# Patient Record
Sex: MALE
[De-identification: names, ages, dates, MRNs, and addresses within clinical notes are randomized per-mention and may not be internally consistent; named-entity substitution may affect disease eponyms.]

---

## 2023-02-27 PROBLEM — Z00.00 ENCOUNTER FOR PREVENTIVE HEALTH EXAMINATION: Status: ACTIVE | Noted: 2023-02-27

## 2023-03-07 ENCOUNTER — APPOINTMENT (OUTPATIENT)
Dept: UROLOGY | Facility: CLINIC | Age: 73
End: 2023-03-07
Payer: MEDICARE

## 2023-03-07 VITALS — BODY MASS INDEX: 23.99 KG/M2 | HEIGHT: 69 IN | WEIGHT: 162 LBS

## 2023-03-07 VITALS
TEMPERATURE: 98 F | HEART RATE: 70 BPM | SYSTOLIC BLOOD PRESSURE: 125 MMHG | DIASTOLIC BLOOD PRESSURE: 73 MMHG | OXYGEN SATURATION: 95 %

## 2023-03-07 DIAGNOSIS — C61 MALIGNANT NEOPLASM OF PROSTATE: ICD-10-CM

## 2023-03-07 PROCEDURE — 99204 OFFICE O/P NEW MOD 45 MIN: CPT

## 2023-03-08 LAB
APPEARANCE: CLEAR
BACTERIA: NEGATIVE
BILIRUBIN URINE: NEGATIVE
BLOOD URINE: NEGATIVE
CALCIUM OXALATE CRYSTALS: ABNORMAL
COLOR: ABNORMAL
GLUCOSE QUALITATIVE U: NEGATIVE
HYALINE CASTS: 0 /LPF
KETONES URINE: ABNORMAL
LEUKOCYTE ESTERASE URINE: NEGATIVE
MICROSCOPIC-UA: NORMAL
NITRITE URINE: NEGATIVE
PH URINE: 5.5
PROTEIN URINE: ABNORMAL
RED BLOOD CELLS URINE: 0 /HPF
SPECIFIC GRAVITY URINE: 1.03
SQUAMOUS EPITHELIAL CELLS: 0 /HPF
UROBILINOGEN URINE: NORMAL
WHITE BLOOD CELLS URINE: 1 /HPF

## 2023-03-08 NOTE — HISTORY OF PRESENT ILLNESS
[FreeTextEntry1] : Dr. Vasquez Gallegos\par \par Dr. Lauren Ro\par [address]\[place] [address]\par \par \par CC: Prostate Cancer\par \par 72 year old male with pMHX significant for Diverticulosis (last year), elevated cholesterol, right should bursitis, GERD, migraine headaches, trigeminal neuralgia, umbilical hernia, ventral hernia.\par \par PSA increased to 3.9 with ? abnormal LEANNA by Dr. Gallegos\par MRI showed 37 cc gland with PIRADs 4 lesion 1 cm right posterior apex/mid gland\par \par Biopsy:\par Target lesion- GG2\par Right lateral apex- GG2\par Right lateral mid- GG2\par \par Has noticed a decrease in libido since biopsy\par Erections prior to biopsy were good, no problems \par \par Finasteride and Alfuzosin for many years\par Reports nocturia x 3, good stream, no intermittency of stream, no straining to start stream \par \par Followed with Dr. Solitario [facility]  for the past 4 years \par 2.9-3.1 on Finasteride \par \par He is active, and bike rides up to 60 miles per day \par \par Social Hx: nonsmoker, occasional ETOH use, retired carpet \par Surgical Hx: prostate biopsy \par Family Hx: no prostate, mother with breast cancer\par ROS:  Negative 10 system review

## 2023-03-08 NOTE — ASSESSMENT
[FreeTextEntry1] : Today we discussed the natural history of localized prostate cancer, and the heterogeneous biology of this malignancy. We discussed the fact that many prostate cancers are slow growing and unlikely to metastasize or cause death, whereas others can be life threatening. We reviewed risk stratification, staging, Red scoring, and PSA levels as they pertain to risk. \par \par All treatment options were reviewed. This included active surveillance, androgen deprivation, emerging options such as focal therapy/HIFU/cryotherapy, radiation options (including IMRT, SBRT, brachytherapy) and surgical options (open vs. robotic surgery, nerve vs. non-nerve sparing). Oncologic outcomes were compared and contrasted. Risks of biochemical and clinical recurrence discussed. Risks of needing adjuvant or salvage treatments reviewed. We discussed the risks of secondary malignancy after radiation. We discussed the opportunity for pathological staging with surgery vs. other options. We discussed the possibility of positive margins, treatment failure, cancer recurrence and cancer-related death even with treatment. \par \par All potential side effects of treatment were reviewed including, but not limited to: short term or permanent stress urinary incontinence and/or short term or permanent erectile dysfunction, penile shortening, rectal symptoms/pain, perineal pain, and other side effects. \par \par All potential complications of treatment and surgery were reviewed including, but not limited to: risks of conversion from MIS to open surgery discussed, bleeding//life-threatening hemorrhage, rectal injury requiring colostomy or delayed recognition leading to fistula, vascular/bowel/adjacent visceral organ injury, trocar/access injury, the possibility of recognized vs. unrecognized/delayed-recognition injury, risks of thermal/blunt/sharp/retraction injury, risks of DVT, PE, MI, death, risks of cardiopulmonary/anesthesia related complications, positional injury, infection/collection/abscess, wound complications/dehiscence/seroma/cellulitis, urinoma/fistula, ureteral injury/obstruction, bladder neck contracture, penile shortening, meatal stenosis, urethral stricture, lymphocele, obturator nerve injury, prolonged anastomotic leak, and other complications. \par \par \par \par

## 2023-03-17 LAB — BACTERIA UR CULT: NORMAL

## 2023-03-20 ENCOUNTER — OUTPATIENT (OUTPATIENT)
Dept: OUTPATIENT SERVICES | Facility: HOSPITAL | Age: 73
LOS: 1 days | End: 2023-03-20
Payer: MEDICARE

## 2023-03-20 DIAGNOSIS — C61 MALIGNANT NEOPLASM OF PROSTATE: ICD-10-CM

## 2023-03-21 ENCOUNTER — RESULT REVIEW (OUTPATIENT)
Age: 73
End: 2023-03-21

## 2023-03-21 LAB — SURGICAL PATHOLOGY STUDY: SIGNIFICANT CHANGE UP

## 2023-03-21 PROCEDURE — 88321 CONSLTJ&REPRT SLD PREP ELSWR: CPT

## 2023-04-05 ENCOUNTER — APPOINTMENT (OUTPATIENT)
Dept: UROLOGY | Facility: CLINIC | Age: 73
End: 2023-04-05
Payer: MEDICARE

## 2023-04-05 DIAGNOSIS — N13.8 BENIGN PROSTATIC HYPERPLASIA WITH LOWER URINARY TRACT SYMPMS: ICD-10-CM

## 2023-04-05 DIAGNOSIS — N40.1 BENIGN PROSTATIC HYPERPLASIA WITH LOWER URINARY TRACT SYMPMS: ICD-10-CM

## 2023-04-05 PROCEDURE — 99443: CPT | Mod: 95

## 2023-04-05 RX ORDER — ALFUZOSIN HYDROCHLORIDE 10 MG/1
TABLET, EXTENDED RELEASE ORAL
Refills: 0 | Status: ACTIVE | COMMUNITY

## 2023-04-05 RX ORDER — RIZATRIPTAN BENZOATE 5 MG/1
5 TABLET ORAL
Refills: 0 | Status: ACTIVE | COMMUNITY

## 2023-04-05 RX ORDER — TADALAFIL 5 MG/1
5 TABLET ORAL
Qty: 90 | Refills: 3 | Status: ACTIVE | COMMUNITY
Start: 2023-04-05 | End: 1900-01-01

## 2023-04-05 RX ORDER — SUMATRIPTAN 25 MG/1
25 TABLET, FILM COATED ORAL
Refills: 0 | Status: ACTIVE | COMMUNITY

## 2023-04-05 RX ORDER — ATORVASTATIN CALCIUM 10 MG/1
10 TABLET, FILM COATED ORAL
Refills: 0 | Status: ACTIVE | COMMUNITY

## 2023-04-05 RX ORDER — FINASTERIDE 5 MG/1
5 TABLET, FILM COATED ORAL
Refills: 0 | Status: ACTIVE | COMMUNITY

## 2023-04-05 NOTE — HISTORY OF PRESENT ILLNESS
[Home] : at home, [unfilled] , at the time of the visit. [Medical Office: ([facility])___] : at the medical office located in  [Verbal consent obtained from patient] : the patient, [unfilled] [FreeTextEntry1] : Dr. Johnathan Mosher (cardiologist)\par [address],\[place] [address]\par \par Dr. Lauren Ro\par [address] [facility] [facility] [address]\[place] [address]\par \par Dr. Vasquez Gallegos\par \par CC: Prostate Cancer\par \par Mr. Gomez is a 73 year old male who will be undergoing a SP radical prostatectomy with Dr. Rolon on 4/12/23.  He has a pMHx significant for Diverticulosis (last year), elevated cholesterol, GERD, migraine headaches, trigeminal neuralgia, umbilical hernia, ventral hernia.\par \par PSA increased to 3.9 ( Corrected to ~8 on Finasteride) with ? abnormal LEANNA by Dr. Gallegos\par MRI showed 37 cc gland with PIRADs 4 lesion 1 cm right posterior apex/mid gland\par \par Followed with Dr. Solitario [facility] for the past 4 years \par 2.9-3.1 on Finasteride \par \par \par Biopsy:\par Target lesion- GG2\par Right lateral apex- GG2\par Right lateral mid- GG2\par \par Libido has improved after biopsy \par Erections are good, no trouble maintaining erection\par \par Has been taking Finasteride and Alfuzosin for many years\par Reports nocturia x 3, good stream, no intermittency of stream, no straining to start stream \par \par \par \par Social Hx: nonsmoker, occasional ETOH use, retired carpet \par Surgical Hx: prostate biopsy \par Family Hx: no prostate, mother with breast cancer\par

## 2023-04-05 NOTE — ASSESSMENT
[FreeTextEntry1] : Diagnosis: Prostate Cancer\par \par Plan:\par \par He can stop Alfuzosin and Finasteride after procedure \par \par We discussed expected side effects of robotic surgery including but not limited to scrotal edema and ecchymosis that may last for greater than 1 week, referred should pain post procedure, bladder spams while catheter is in place, intermittent hematuria, and constipation management.\par \par We discussed catheter.  He is aware that catheter will be in place for minimum of 7 days post operatively.  He will start isela cath antibiotics 1 day prior to removal.  I demonstrated how to properly switch from a leg bag to overnight bag.  We also reviewed importance of applying stat lock to prevent catheter from puling.\par \par It is important that he is up and ambulating day for procedure which he verbalized an understanding of.  We discussed incision care and time line for showering.  No heavy lifting for the next 4 weeks post procedure.\par \par I recommend he start Daily Tadalafil 5 mg and continue post procedure.  \par \par He will begin Kegel exercises today with a goal of 50 Kegels per day.\par \par Follow up 1 week after procedure\par

## 2023-04-11 ENCOUNTER — TRANSCRIPTION ENCOUNTER (OUTPATIENT)
Age: 73
End: 2023-04-11

## 2023-04-11 VITALS
SYSTOLIC BLOOD PRESSURE: 126 MMHG | WEIGHT: 169.76 LBS | HEIGHT: 69 IN | RESPIRATION RATE: 16 BRPM | OXYGEN SATURATION: 99 % | TEMPERATURE: 97 F | DIASTOLIC BLOOD PRESSURE: 79 MMHG | HEART RATE: 61 BPM

## 2023-04-11 NOTE — PATIENT PROFILE ADULT - FALL HARM RISK - UNIVERSAL INTERVENTIONS
Bed in lowest position, wheels locked, appropriate side rails in place/Call bell, personal items and telephone in reach/Instruct patient to call for assistance before getting out of bed or chair/Non-slip footwear when patient is out of bed/[place] to call system/Physically safe environment - no spills, clutter or unnecessary equipment/Purposeful Proactive Rounding/Room/bathroom lighting operational, light cord in reach

## 2023-04-12 ENCOUNTER — RESULT REVIEW (OUTPATIENT)
Age: 73
End: 2023-04-12

## 2023-04-12 ENCOUNTER — APPOINTMENT (OUTPATIENT)
Dept: UROLOGY | Facility: HOSPITAL | Age: 73
End: 2023-04-12

## 2023-04-12 ENCOUNTER — TRANSCRIPTION ENCOUNTER (OUTPATIENT)
Age: 73
End: 2023-04-12

## 2023-04-12 ENCOUNTER — INPATIENT (INPATIENT)
Facility: HOSPITAL | Age: 73
LOS: 0 days | Discharge: HOME | DRG: 708 | End: 2023-04-13
Attending: UROLOGY | Admitting: UROLOGY
Payer: MEDICARE

## 2023-04-12 DIAGNOSIS — Z90.89 ACQUIRED ABSENCE OF OTHER ORGANS: Chronic | ICD-10-CM

## 2023-04-12 DIAGNOSIS — C61 MALIGNANT NEOPLASM OF PROSTATE: ICD-10-CM

## 2023-04-12 DIAGNOSIS — Z98.890 OTHER SPECIFIED POSTPROCEDURAL STATES: Chronic | ICD-10-CM

## 2023-04-12 LAB
BLD GP AB SCN SERPL QL: NEGATIVE — SIGNIFICANT CHANGE UP
RH IG SCN BLD-IMP: POSITIVE — SIGNIFICANT CHANGE UP

## 2023-04-12 PROCEDURE — 38571 LAPAROSCOPY LYMPHADENECTOMY: CPT

## 2023-04-12 PROCEDURE — 55866 LAPS SURG PRST8ECT RPBIC RAD: CPT

## 2023-04-12 PROCEDURE — 88309 TISSUE EXAM BY PATHOLOGIST: CPT | Mod: 26

## 2023-04-12 PROCEDURE — 88305 TISSUE EXAM BY PATHOLOGIST: CPT | Mod: 26

## 2023-04-12 DEVICE — SURGICEL FIBRILLAR 4 X 4": Type: IMPLANTABLE DEVICE | Status: FUNCTIONAL

## 2023-04-12 DEVICE — LIGATING CLIPS WECK HEMOLOK POLYMER MEDIUM-LARGE (GREEN) 6: Type: IMPLANTABLE DEVICE | Status: FUNCTIONAL

## 2023-04-12 RX ORDER — ACETAMINOPHEN 500 MG
650 TABLET ORAL EVERY 6 HOURS
Refills: 0 | Status: DISCONTINUED | OUTPATIENT
Start: 2023-04-12 | End: 2023-04-13

## 2023-04-12 RX ORDER — BENZOCAINE AND MENTHOL 5; 1 G/100ML; G/100ML
1 LIQUID ORAL
Refills: 0 | Status: DISCONTINUED | OUTPATIENT
Start: 2023-04-12 | End: 2023-04-13

## 2023-04-12 RX ORDER — SODIUM CHLORIDE 9 MG/ML
1000 INJECTION INTRAMUSCULAR; INTRAVENOUS; SUBCUTANEOUS
Refills: 0 | Status: DISCONTINUED | OUTPATIENT
Start: 2023-04-12 | End: 2023-04-13

## 2023-04-12 RX ORDER — ATORVASTATIN CALCIUM 80 MG/1
10 TABLET, FILM COATED ORAL AT BEDTIME
Refills: 0 | Status: DISCONTINUED | OUTPATIENT
Start: 2023-04-12 | End: 2023-04-13

## 2023-04-12 RX ORDER — LIDOCAINE 4 G/100G
2 CREAM TOPICAL DAILY
Refills: 0 | Status: DISCONTINUED | OUTPATIENT
Start: 2023-04-12 | End: 2023-04-13

## 2023-04-12 RX ORDER — OXYBUTYNIN CHLORIDE 5 MG
5 TABLET ORAL EVERY 8 HOURS
Refills: 0 | Status: DISCONTINUED | OUTPATIENT
Start: 2023-04-12 | End: 2023-04-13

## 2023-04-12 RX ORDER — ACETAMINOPHEN 500 MG
1000 TABLET ORAL ONCE
Refills: 0 | Status: COMPLETED | OUTPATIENT
Start: 2023-04-12 | End: 2023-04-12

## 2023-04-12 RX ORDER — OXYCODONE HYDROCHLORIDE 5 MG/1
5 TABLET ORAL EVERY 6 HOURS
Refills: 0 | Status: DISCONTINUED | OUTPATIENT
Start: 2023-04-12 | End: 2023-04-13

## 2023-04-12 RX ORDER — ATORVASTATIN CALCIUM 80 MG/1
1 TABLET, FILM COATED ORAL
Refills: 0 | DISCHARGE

## 2023-04-12 RX ORDER — HYDROMORPHONE HYDROCHLORIDE 2 MG/ML
0.5 INJECTION INTRAMUSCULAR; INTRAVENOUS; SUBCUTANEOUS
Refills: 0 | Status: DISCONTINUED | OUTPATIENT
Start: 2023-04-12 | End: 2023-04-12

## 2023-04-12 RX ORDER — SENNA PLUS 8.6 MG/1
2 TABLET ORAL AT BEDTIME
Refills: 0 | Status: DISCONTINUED | OUTPATIENT
Start: 2023-04-12 | End: 2023-04-13

## 2023-04-12 RX ORDER — ENOXAPARIN SODIUM 100 MG/ML
40 INJECTION SUBCUTANEOUS EVERY 24 HOURS
Refills: 0 | Status: DISCONTINUED | OUTPATIENT
Start: 2023-04-13 | End: 2023-04-13

## 2023-04-12 RX ORDER — RIZATRIPTAN BENZOATE 5 MG/1
1 TABLET ORAL
Refills: 0 | DISCHARGE

## 2023-04-12 RX ORDER — ENOXAPARIN SODIUM 100 MG/ML
40 INJECTION SUBCUTANEOUS ONCE
Refills: 0 | Status: COMPLETED | OUTPATIENT
Start: 2023-04-12 | End: 2023-04-12

## 2023-04-12 RX ORDER — KETOROLAC TROMETHAMINE 30 MG/ML
15 SYRINGE (ML) INJECTION EVERY 8 HOURS
Refills: 0 | Status: DISCONTINUED | OUTPATIENT
Start: 2023-04-12 | End: 2023-04-13

## 2023-04-12 RX ORDER — ONDANSETRON 8 MG/1
4 TABLET, FILM COATED ORAL EVERY 6 HOURS
Refills: 0 | Status: DISCONTINUED | OUTPATIENT
Start: 2023-04-12 | End: 2023-04-13

## 2023-04-12 RX ORDER — CEFAZOLIN SODIUM 1 G
1000 VIAL (EA) INJECTION EVERY 8 HOURS
Refills: 0 | Status: COMPLETED | OUTPATIENT
Start: 2023-04-12 | End: 2023-04-13

## 2023-04-12 RX ADMIN — Medication 5 MILLIGRAM(S): at 23:03

## 2023-04-12 RX ADMIN — Medication 100 MILLIGRAM(S): at 20:32

## 2023-04-12 RX ADMIN — Medication 1000 MILLIGRAM(S): at 09:58

## 2023-04-12 RX ADMIN — LIDOCAINE 2 PATCH: 4 CREAM TOPICAL at 18:03

## 2023-04-12 RX ADMIN — ENOXAPARIN SODIUM 40 MILLIGRAM(S): 100 INJECTION SUBCUTANEOUS at 09:58

## 2023-04-12 RX ADMIN — ATORVASTATIN CALCIUM 10 MILLIGRAM(S): 80 TABLET, FILM COATED ORAL at 23:03

## 2023-04-12 RX ADMIN — SENNA PLUS 2 TABLET(S): 8.6 TABLET ORAL at 23:03

## 2023-04-12 RX ADMIN — Medication 650 MILLIGRAM(S): at 18:19

## 2023-04-12 RX ADMIN — LIDOCAINE 2 PATCH: 4 CREAM TOPICAL at 20:45

## 2023-04-12 RX ADMIN — Medication 650 MILLIGRAM(S): at 23:27

## 2023-04-12 RX ADMIN — Medication 15 MILLIGRAM(S): at 23:03

## 2023-04-12 RX ADMIN — Medication 5 MILLIGRAM(S): at 20:32

## 2023-04-12 RX ADMIN — Medication 650 MILLIGRAM(S): at 23:16

## 2023-04-12 NOTE — H&P ADULT - HISTORY OF PRESENT ILLNESS
CC: Prostate Cancer    Mr. Gomez is a 73 year old male who will be undergoing a SP radical prostatectomy with Dr. Rolon on 4/12/23. He has a pMHx significant for Diverticulosis (last year), elevated cholesterol, GERD, migraine headaches, trigeminal neuralgia, umbilical hernia, ventral hernia.    PSA increased to 3.9 ( Corrected to ~8 on Finasteride) with ? abnormal LEANNA by Dr. Gallegos  MRI showed 37 cc gland with PIRADs 4 lesion 1 cm right posterior apex/mid gland    Followed with Dr. Solitario [facility] for the past 4 years   2.9-3.1 on Finasteride       Biopsy:  Target lesion- GG2  Right lateral apex- GG2  Right lateral mid- GG2    Libido has improved after biopsy   Erections are good, no trouble maintaining erection    Has been taking Finasteride and Alfuzosin for many years  Reports nocturia x 3, good stream, no intermittency of stream, no straining to start stream         Social Hx: nonsmoker, occasional ETOH use, retired carpet   Surgical Hx: prostate biopsy   Family Hx: no prostate, mother with breast cancer       Active Problems  Prostate cancer (185) (C61)    Current Meds  Alfuzosin HCl ER TB24  Atorvastatin Calcium 10 MG Oral Tablet  Finasteride 5 MG Oral Tablet  Rizatriptan Benzoate 5 MG Oral Tablet  SUMAtriptan Succinate 25 MG Oral Tablet    Allergies  No Known Drug Allergies    Review of Systems    Constitutional are otherwise negative.

## 2023-04-12 NOTE — H&P ADULT - ASSESSMENT
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    We discussed catheter. He is aware that catheter will be in place for minimum of 7 days post operatively. He will start isela cath antibiotics 1 day prior to removal. I demonstrated how to properly switch from a leg bag to overnight bag. We also reviewed importance of applying stat lock to prevent catheter from puling.    It is important that he is up and ambulating day for procedure which he verbalized an understanding of. We discussed incision care and time line for showering. No heavy lifting for the next 4 weeks post procedure.    I recommend he start Daily Tadalafil 5 mg and continue post procedure.     He will begin Kegel exercises today with a goal of 50 Kegels per day.    Follow up 1 week after procedure

## 2023-04-12 NOTE — BRIEF OPERATIVE NOTE - NSICDXBRIEFPROCEDURE_GEN_ALL_CORE_FT
PROCEDURES:  Robotic-assisted prostatectomy with pelvic lymph node dissection 12-Apr-2023 16:58:30  Héctor Dorantes

## 2023-04-13 ENCOUNTER — TRANSCRIPTION ENCOUNTER (OUTPATIENT)
Age: 73
End: 2023-04-13

## 2023-04-13 VITALS
HEART RATE: 60 BPM | OXYGEN SATURATION: 97 % | TEMPERATURE: 98 F | SYSTOLIC BLOOD PRESSURE: 117 MMHG | DIASTOLIC BLOOD PRESSURE: 67 MMHG | RESPIRATION RATE: 18 BRPM

## 2023-04-13 LAB
ANION GAP SERPL CALC-SCNC: 10 MMOL/L — SIGNIFICANT CHANGE UP (ref 5–17)
BUN SERPL-MCNC: 19 MG/DL — SIGNIFICANT CHANGE UP (ref 7–23)
CALCIUM SERPL-MCNC: 8.8 MG/DL — SIGNIFICANT CHANGE UP (ref 8.4–10.5)
CHLORIDE SERPL-SCNC: 106 MMOL/L — SIGNIFICANT CHANGE UP (ref 96–108)
CO2 SERPL-SCNC: 20 MMOL/L — LOW (ref 22–31)
CREAT FLD-MCNC: 1.02 MG/DL — SIGNIFICANT CHANGE UP
CREAT SERPL-MCNC: 0.92 MG/DL — SIGNIFICANT CHANGE UP (ref 0.5–1.3)
EGFR: 88 ML/MIN/1.73M2 — SIGNIFICANT CHANGE UP
GLUCOSE SERPL-MCNC: 108 MG/DL — HIGH (ref 70–99)
HCT VFR BLD CALC: 40.1 % — SIGNIFICANT CHANGE UP (ref 39–50)
HGB BLD-MCNC: 13.4 G/DL — SIGNIFICANT CHANGE UP (ref 13–17)
MAGNESIUM SERPL-MCNC: 2 MG/DL — SIGNIFICANT CHANGE UP (ref 1.6–2.6)
MCHC RBC-ENTMCNC: 31.2 PG — SIGNIFICANT CHANGE UP (ref 27–34)
MCHC RBC-ENTMCNC: 33.4 GM/DL — SIGNIFICANT CHANGE UP (ref 32–36)
MCV RBC AUTO: 93.5 FL — SIGNIFICANT CHANGE UP (ref 80–100)
NRBC # BLD: 0 /100 WBCS — SIGNIFICANT CHANGE UP (ref 0–0)
PHOSPHATE SERPL-MCNC: 4 MG/DL — SIGNIFICANT CHANGE UP (ref 2.5–4.5)
PLATELET # BLD AUTO: 190 K/UL — SIGNIFICANT CHANGE UP (ref 150–400)
POTASSIUM SERPL-MCNC: 4.5 MMOL/L — SIGNIFICANT CHANGE UP (ref 3.5–5.3)
POTASSIUM SERPL-SCNC: 4.5 MMOL/L — SIGNIFICANT CHANGE UP (ref 3.5–5.3)
RBC # BLD: 4.29 M/UL — SIGNIFICANT CHANGE UP (ref 4.2–5.8)
RBC # FLD: 12.8 % — SIGNIFICANT CHANGE UP (ref 10.3–14.5)
SODIUM SERPL-SCNC: 136 MMOL/L — SIGNIFICANT CHANGE UP (ref 135–145)
WBC # BLD: 11 K/UL — HIGH (ref 3.8–10.5)
WBC # FLD AUTO: 11 K/UL — HIGH (ref 3.8–10.5)

## 2023-04-13 PROCEDURE — 51600 INJECTION FOR BLADDER X-RAY: CPT

## 2023-04-13 PROCEDURE — 74430 CONTRAST X-RAY BLADDER: CPT | Mod: 26

## 2023-04-13 RX ORDER — FINASTERIDE 5 MG/1
1 TABLET, FILM COATED ORAL
Refills: 0 | DISCHARGE

## 2023-04-13 RX ORDER — ALFUZOSIN HYDROCHLORIDE 10 MG/1
1 TABLET, EXTENDED RELEASE ORAL
Refills: 0 | DISCHARGE

## 2023-04-13 RX ADMIN — ENOXAPARIN SODIUM 40 MILLIGRAM(S): 100 INJECTION SUBCUTANEOUS at 13:01

## 2023-04-13 RX ADMIN — LIDOCAINE 2 PATCH: 4 CREAM TOPICAL at 12:59

## 2023-04-13 RX ADMIN — Medication 15 MILLIGRAM(S): at 00:04

## 2023-04-13 RX ADMIN — Medication 650 MILLIGRAM(S): at 12:58

## 2023-04-13 RX ADMIN — Medication 15 MILLIGRAM(S): at 15:47

## 2023-04-13 RX ADMIN — Medication 100 MILLIGRAM(S): at 12:54

## 2023-04-13 RX ADMIN — Medication 5 MILLIGRAM(S): at 12:58

## 2023-04-13 RX ADMIN — LIDOCAINE 2 PATCH: 4 CREAM TOPICAL at 06:48

## 2023-04-13 RX ADMIN — Medication 15 MILLIGRAM(S): at 05:29

## 2023-04-13 RX ADMIN — Medication 5 MILLIGRAM(S): at 05:29

## 2023-04-13 RX ADMIN — Medication 650 MILLIGRAM(S): at 05:29

## 2023-04-13 RX ADMIN — SODIUM CHLORIDE 120 MILLILITER(S): 9 INJECTION INTRAMUSCULAR; INTRAVENOUS; SUBCUTANEOUS at 13:01

## 2023-04-13 RX ADMIN — Medication 100 MILLIGRAM(S): at 03:52

## 2023-04-13 NOTE — DISCHARGE NOTE PROVIDER - CARE PROVIDER_API CALL
Franklin Rolon)  Urology  [address]  [place], [place] [id number]  Phone: [phone]  Fax: [phone]  Follow Up Time:

## 2023-04-13 NOTE — PROGRESS NOTE ADULT - ASSESSMENT
73 year old male with history of CaP s/p single port RALP 04/12
73 year old male with history of CaP s/p single port RALP 04/12

## 2023-04-13 NOTE — DISCHARGE NOTE PROVIDER - NSDCMRMEDTOKEN_GEN_ALL_CORE_FT
alfuzosin 10 mg oral tablet, extended release: 1 orally once a day  atorvastatin 10 mg oral tablet: 1 orally once a day  Augmentin 500 mg-125 mg oral tablet: 1 tab(s) orally 2 times a day Please take one tab twice a day for three days. Please start day before new removal  finasteride 5 mg oral tablet: 1 orally once a day  rizatriptan 5 mg oral tablet: 1 tablet orally once a day as needed for  headache   atorvastatin 10 mg oral tablet: 1 orally once a day  Augmentin 500 mg-125 mg oral tablet: 1 tab(s) orally 2 times a day Please take one tab twice a day for three days. Please start day before new removal  rizatriptan 5 mg oral tablet: 1 tablet orally once a day as needed for  headache

## 2023-04-13 NOTE — DISCHARGE NOTE PROVIDER - HOSPITAL COURSE
73 year old male with history of CaP s/p single port RALP 04/12. Pt is hemodynamically stable and optimized for discharge.

## 2023-04-13 NOTE — DISCHARGE NOTE PROVIDER - NSDCFUADDAPPT_GEN_ALL_CORE_FT
Make follow up appointment with Dr Rolon in 7-10 days.     Please start antibiotic augmentin 1 day before new catheter.

## 2023-04-13 NOTE — PROGRESS NOTE ADULT - SUBJECTIVE AND OBJECTIVE BOX
INTERVAL HPI/OVERNIGHT EVENTS:  No acute events overnight.    VITALS:    T(F): 98.4 (04-13-23 @ 00:42), Max: 98.4 (04-13-23 @ 00:42)  HR: 67 (04-13-23 @ 00:42) (56 - 77)  BP: 114/63 (04-13-23 @ 00:42) (88/55 - 126/79)  RR: 17 (04-13-23 @ 00:42) (14 - 20)  SpO2: 95% (04-13-23 @ 00:42) (91% - 100%)  Wt(kg): --    I&O's Detail    12 Apr 2023 07:01  -  13 Apr 2023 05:18  --------------------------------------------------------  IN:    sodium chloride 0.9%: 360 mL  Total IN: 360 mL    OUT:    Bulb (mL): 95 mL    Indwelling Catheter - Urethral (mL): 600 mL  Total OUT: 695 mL    Total NET: -335 mL          MEDICATIONS:    ANTIBIOTICS:  ceFAZolin   IVPB 1000 milliGRAM(s) IV Intermittent every 8 hours      PAIN CONTROL:  acetaminophen     Tablet .. 650 milliGRAM(s) Oral every 6 hours  ketorolac   Injectable 15 milliGRAM(s) IV Push every 8 hours  ondansetron Injectable 4 milliGRAM(s) IV Push every 6 hours PRN  oxyCODONE    IR 5 milliGRAM(s) Oral every 6 hours PRN       MEDS:  oxybutynin 5 milliGRAM(s) Oral every 8 hours      HEME/ONC        PHYSICAL EXAM:  General: No acute distress.  Alert and Oriented  Abdominal Exam: incisions dry and clean, RUI drain in place with sanguinous output, appropriately tender to palpation, appropriately distented  : Gill catheter in place, mild hematuria      LABS:          
 Post OP/Procedure note: DELMY [id number] [id number]    Patient reports to minimal abdominal discomfort appropriate to current state. He denies any nausea, vomiting, chest pain, sob, dizziness, weakness. Denies suprapubic discomfort.     PE  GEN: NAD  ABD: incisions dry and clean, RUI drain in place with sanguinous output, appropriately tender to palpation, appropriately distented  : Gill catheter in place, mild hematuria    T(C): 36.6 (04-12-23 @ 19:19), Max: 36.6 (04-12-23 @ 19:19)  HR: 77 (04-12-23 @ 19:19) (56 - 77)  BP: 113/63 (04-12-23 @ 19:19) (88/55 - 126/79)  RR: 17 (04-12-23 @ 19:19) (14 - 20)  SpO2: 96% (04-12-23 @ 19:19) (91% - 100%)    04-12-23 @ 07:01  -  04-12-23 @ 21:18  --------------------------------------------------------  IN: 360 mL / OUT: 250 mL / NET: 110 mL

## 2023-04-13 NOTE — DISCHARGE NOTE PROVIDER - NSDCCPCAREPLAN_GEN_ALL_CORE_FT
PRINCIPAL DISCHARGE DIAGNOSIS  Diagnosis: Malignant neoplasm of prostate  Assessment and Plan of Treatment:       SECONDARY DISCHARGE DIAGNOSES  Diagnosis: Hyperlipidemia  Assessment and Plan of Treatment:

## 2023-04-13 NOTE — DISCHARGE NOTE PROVIDER - NSDCFUADDINST_GEN_ALL_CORE_FT
You may disconnect your new catheter from the drainage bag, and let it drain freely while showering.  Make sure your incision sites are clean and dry after showering, it is not necessary to scrub, just let water and soap wash over incision sites.     Advance diet as tolerated, activity as tolerated. No heavy lifting or straining. New to leg bag, Stay well hydrated. If fever >100.4 or any change or worsening of symptoms please call doctor or report to ED. Make follow up appointment with Dr Rolon in 7-10 days. Please start antibiotic augmentin 1 day before new catheter.    You may disconnect your new catheter from the drainage bag, and let it drain freely while showering.  Make sure your incision sites are clean and dry after showering, it is not necessary to scrub, just let water and soap wash over incision sites.     Advance diet as tolerated, activity as tolerated. No heavy lifting or straining. New to leg bag, Stay well hydrated. If fever >100.4 or any change or worsening of symptoms please call doctor or report to ED. Make follow up appointment with Dr Rolon in 7-10 days. Please start antibiotic augmentin 1 day before new catheter.     Please use tylenol/ibuprofen as needed for pain

## 2023-04-13 NOTE — DISCHARGE NOTE NURSING/CASE MANAGEMENT/SOCIAL WORK - NSDCPEFALRISK_GEN_ALL_CORE
For information on Fall & Injury Prevention, visit: [email or web address] OR  [email or web address] OR  https://www.cdc.gov/steadi/patient.html

## 2023-04-13 NOTE — DISCHARGE NOTE NURSING/CASE MANAGEMENT/SOCIAL WORK - PATIENT PORTAL LINK FT
You can access the FollowMyHealth Patient Portal offered by [facility] by registering at the following website: [email or web address]. By joining [facility]’s FollowMyHealth portal, you will also be able to view your health information using other applications (apps) compatible with our system.

## 2023-04-13 NOTE — PROGRESS NOTE ADULT - PROBLEM SELECTOR PLAN 1
-stable  -s/p single port RALP 04/12  -pain control  -bladder spasm control  -home meds  -DVT prophylaxis  -OOB, IS  -RUI creatinine 5am  -home meds  -am labs  -Diet: CLD
-stable  -s/p single port RALP 04/12  -pain control  -bladder spasm control  -home meds  -DVT prophylaxis  -OOB, IS  -URI creatinine 5am  -home meds  -am labs  -Diet: CLD

## 2023-04-14 ENCOUNTER — NON-APPOINTMENT (OUTPATIENT)
Age: 73
End: 2023-04-14

## 2023-04-14 LAB
HCV AB S/CO SERPL IA: 0.08 S/CO — SIGNIFICANT CHANGE UP (ref 0–0.99)
HCV AB SERPL-IMP: SIGNIFICANT CHANGE UP

## 2023-04-17 LAB — SURGICAL PATHOLOGY STUDY: SIGNIFICANT CHANGE UP

## 2023-04-19 PROBLEM — E78.5 HYPERLIPIDEMIA, UNSPECIFIED: Chronic | Status: ACTIVE | Noted: 2023-04-11

## 2023-04-19 PROBLEM — G43.909 MIGRAINE, UNSPECIFIED, NOT INTRACTABLE, WITHOUT STATUS MIGRAINOSUS: Chronic | Status: ACTIVE | Noted: 2023-04-11

## 2023-04-19 PROBLEM — Z86.19 PERSONAL HISTORY OF OTHER INFECTIOUS AND PARASITIC DISEASES: Chronic | Status: ACTIVE | Noted: 2023-04-11

## 2023-04-19 PROBLEM — C61 MALIGNANT NEOPLASM OF PROSTATE: Chronic | Status: ACTIVE | Noted: 2023-04-11

## 2023-04-20 ENCOUNTER — APPOINTMENT (OUTPATIENT)
Dept: UROLOGY | Facility: HOSPITAL | Age: 73
End: 2023-04-20

## 2023-04-20 ENCOUNTER — APPOINTMENT (OUTPATIENT)
Dept: UROLOGY | Facility: CLINIC | Age: 73
End: 2023-04-20
Payer: MEDICARE

## 2023-04-20 VITALS
TEMPERATURE: 98.3 F | SYSTOLIC BLOOD PRESSURE: 108 MMHG | HEART RATE: 68 BPM | DIASTOLIC BLOOD PRESSURE: 65 MMHG | OXYGEN SATURATION: 99 %

## 2023-04-20 PROCEDURE — 84100 ASSAY OF PHOSPHORUS: CPT

## 2023-04-20 PROCEDURE — 99024 POSTOP FOLLOW-UP VISIT: CPT

## 2023-04-20 PROCEDURE — C1889: CPT

## 2023-04-20 PROCEDURE — S2900: CPT

## 2023-04-20 PROCEDURE — 85027 COMPLETE CBC AUTOMATED: CPT

## 2023-04-20 PROCEDURE — 86900 BLOOD TYPING SEROLOGIC ABO: CPT

## 2023-04-20 PROCEDURE — 86803 HEPATITIS C AB TEST: CPT

## 2023-04-20 PROCEDURE — 86901 BLOOD TYPING SEROLOGIC RH(D): CPT

## 2023-04-20 PROCEDURE — 88305 TISSUE EXAM BY PATHOLOGIST: CPT

## 2023-04-20 PROCEDURE — 74430 CONTRAST X-RAY BLADDER: CPT

## 2023-04-20 PROCEDURE — 80048 BASIC METABOLIC PNL TOTAL CA: CPT

## 2023-04-20 PROCEDURE — 86850 RBC ANTIBODY SCREEN: CPT

## 2023-04-20 PROCEDURE — C9399: CPT

## 2023-04-20 PROCEDURE — 82570 ASSAY OF URINE CREATININE: CPT

## 2023-04-20 PROCEDURE — 83735 ASSAY OF MAGNESIUM: CPT

## 2023-04-20 PROCEDURE — 36415 COLL VENOUS BLD VENIPUNCTURE: CPT

## 2023-04-20 PROCEDURE — 88309 TISSUE EXAM BY PATHOLOGIST: CPT

## 2023-04-20 NOTE — HISTORY OF PRESENT ILLNESS
[FreeTextEntry1] : Dr. Johnathan Mosher (cardiologist)\par [address],\[place] [address]\par \par Dr. Lauren Ro\par [address]\[place] [address]\par \par Dr. Vasquez Gallegos\par \par CC: Prostate Cancer\par \par Patient returns 1 week after RALP for catheter removal. \par \par path=Red 7 (3+4), pT2, N0\par \par Patient doing well without N/V/Fever. Urine clear, draining well. \par Tolerating a regular diet, passing gas and BM\par No incisional complaints\par No leg swelling or calf pain, no SOB or CP\par \par Plan is for PSA in 6 weeks\par Patient educated as to performance/importance of Kegel exercises\par \par \par

## 2023-05-09 ENCOUNTER — NON-APPOINTMENT (OUTPATIENT)
Age: 73
End: 2023-05-09

## 2023-05-09 DIAGNOSIS — R31.9 HEMATURIA, UNSPECIFIED: ICD-10-CM

## 2023-05-09 RX ORDER — AMOXICILLIN AND CLAVULANATE POTASSIUM 500; 125 MG/1; MG/1
500-125 TABLET, FILM COATED ORAL
Qty: 14 | Refills: 0 | Status: ACTIVE | COMMUNITY
Start: 2023-05-09 | End: 1900-01-01

## 2023-06-01 ENCOUNTER — APPOINTMENT (OUTPATIENT)
Dept: UROLOGY | Facility: CLINIC | Age: 73
End: 2023-06-01
Payer: MEDICARE

## 2023-06-01 VITALS
DIASTOLIC BLOOD PRESSURE: 71 MMHG | SYSTOLIC BLOOD PRESSURE: 116 MMHG | TEMPERATURE: 98.3 F | HEART RATE: 71 BPM | OXYGEN SATURATION: 99 %

## 2023-06-01 DIAGNOSIS — N52.31 ERECTILE DYSFUNCTION FOLLOWING RADICAL PROSTATECTOMY: ICD-10-CM

## 2023-06-01 PROCEDURE — 99024 POSTOP FOLLOW-UP VISIT: CPT

## 2023-06-01 RX ORDER — TADALAFIL 5 MG/1
5 TABLET ORAL
Qty: 90 | Refills: 3 | Status: ACTIVE | COMMUNITY
Start: 2023-06-01 | End: 1900-01-01

## 2023-06-01 NOTE — PHYSICAL EXAM
[General Appearance - Well Developed] : well developed [General Appearance - Well Nourished] : well nourished [Normal Appearance] : normal appearance [Well Groomed] : well groomed [General Appearance - In No Acute Distress] : no acute distress [Edema] : no peripheral edema [Respiration, Rhythm And Depth] : normal respiratory rhythm and effort [Exaggerated Use Of Accessory Muscles For Inspiration] : no accessory muscle use [Abdomen Soft] : soft [Abdomen Tenderness] : non-tender [Costovertebral Angle Tenderness] : no ~M costovertebral angle tenderness [Urethral Meatus] : meatus normal [Urinary Bladder Findings] : the bladder was normal on palpation [Scrotum] : the scrotum was normal [Epididymis] : the epididymides were normal [Testes Tenderness] : no tenderness of the testes [Testes Mass (___cm)] : there were no testicular masses [Normal Station and Gait] : the gait and station were normal for the patient's age [] : no rash [No Focal Deficits] : no focal deficits [Oriented To Time, Place, And Person] : oriented to person, place, and time [Affect] : the affect was normal [Mood] : the mood was normal [Not Anxious] : not anxious [No Palpable Adenopathy] : no palpable adenopathy

## 2023-06-01 NOTE — ASSESSMENT
[FreeTextEntry1] : Diagnosis: prostate cancer \par \par Plan: \par Doing well \par Recovering appropriately  \par PSA undetectable \par \par Daily Cialis \par PSA 6 months \par \par Franklin Rolon MD, FACS, FRCS \par  of Urology [facility]\par Director of Laparoscopic and Robotic Surgery \par [facility] Director of Urology, [facility] \par Professor of Urology\par \par (Office) \par (Cell)  [phone] \par [email or web address]\par \par \par

## 2023-06-01 NOTE — HISTORY OF PRESENT ILLNESS
[FreeTextEntry1] : Dr. Johnathan Mosher (cardiologist)\par [address] [facility] [address]\par \par Dr. Lauren Ro\par [address] [facility] [facility] [address]\[place] [address]\par \par Dr. Vasquez Gallegos\par [address] [facility] [facility] [address], [place] [address]\par [phone]\par \par CC: Prostate Cancer\par \par Patient returns 1 week after RALP for catheter removal. \par Continence "improving every day"\par He has some AMBREEN with sudden movements/exacerbations/stresses but not constant/severe\par Strong powerful urinary stream\par \par On exam, the pad is 6 hours old, and only staining the size of a silver dollar or slight larger; no leak witnessed with 4 coughs on exam\par \par Path=[place] 7 (3+4), pT2, N0\par PSA <0.05 ng/ml \par \par Social Hx: Nonsmoker, occasional ETOH use, retired carpet \par Surgical Hx: Robotic radical prostatectomy\par Family Hx: No prostate, mother with breast cancer\par ROS: Negative 10 system review \par
